# Patient Record
Sex: FEMALE | Race: WHITE
[De-identification: names, ages, dates, MRNs, and addresses within clinical notes are randomized per-mention and may not be internally consistent; named-entity substitution may affect disease eponyms.]

---

## 2017-09-20 ENCOUNTER — HOSPITAL ENCOUNTER (INPATIENT)
Dept: HOSPITAL 83 - ED | Age: 72
LOS: 2 days | Discharge: HOME | DRG: 438 | End: 2017-09-22
Attending: INTERNAL MEDICINE | Admitting: INTERNAL MEDICINE
Payer: COMMERCIAL

## 2017-09-20 VITALS — BODY MASS INDEX: 24.66 KG/M2 | WEIGHT: 148 LBS | HEIGHT: 65 IN

## 2017-09-20 VITALS — DIASTOLIC BLOOD PRESSURE: 71 MMHG

## 2017-09-20 VITALS — DIASTOLIC BLOOD PRESSURE: 71 MMHG | SYSTOLIC BLOOD PRESSURE: 159 MMHG

## 2017-09-20 VITALS — SYSTOLIC BLOOD PRESSURE: 125 MMHG | DIASTOLIC BLOOD PRESSURE: 71 MMHG

## 2017-09-20 VITALS — DIASTOLIC BLOOD PRESSURE: 90 MMHG

## 2017-09-20 DIAGNOSIS — E44.0: ICD-10-CM

## 2017-09-20 DIAGNOSIS — E86.0: ICD-10-CM

## 2017-09-20 DIAGNOSIS — N17.0: ICD-10-CM

## 2017-09-20 DIAGNOSIS — I25.2: ICD-10-CM

## 2017-09-20 DIAGNOSIS — M54.9: ICD-10-CM

## 2017-09-20 DIAGNOSIS — E87.2: ICD-10-CM

## 2017-09-20 DIAGNOSIS — Z79.84: ICD-10-CM

## 2017-09-20 DIAGNOSIS — Z98.51: ICD-10-CM

## 2017-09-20 DIAGNOSIS — I10: ICD-10-CM

## 2017-09-20 DIAGNOSIS — Z88.0: ICD-10-CM

## 2017-09-20 DIAGNOSIS — Z95.5: ICD-10-CM

## 2017-09-20 DIAGNOSIS — G89.29: ICD-10-CM

## 2017-09-20 DIAGNOSIS — E78.5: ICD-10-CM

## 2017-09-20 DIAGNOSIS — D64.9: ICD-10-CM

## 2017-09-20 DIAGNOSIS — K85.90: Primary | ICD-10-CM

## 2017-09-20 DIAGNOSIS — K52.9: ICD-10-CM

## 2017-09-20 DIAGNOSIS — I25.10: ICD-10-CM

## 2017-09-20 DIAGNOSIS — Z79.899: ICD-10-CM

## 2017-09-20 DIAGNOSIS — M51.36: ICD-10-CM

## 2017-09-20 DIAGNOSIS — Z79.1: ICD-10-CM

## 2017-09-20 DIAGNOSIS — Z80.1: ICD-10-CM

## 2017-09-20 DIAGNOSIS — N39.0: ICD-10-CM

## 2017-09-20 DIAGNOSIS — Z83.79: ICD-10-CM

## 2017-09-20 DIAGNOSIS — M19.90: ICD-10-CM

## 2017-09-20 DIAGNOSIS — E11.65: ICD-10-CM

## 2017-09-20 DIAGNOSIS — M47.896: ICD-10-CM

## 2017-09-20 LAB
ALBUMIN SERPL-MCNC: 3.3 GM/DL (ref 3.1–4.5)
ALP SERPL-CCNC: 74 U/L (ref 45–117)
ALT SERPL W P-5'-P-CCNC: 21 U/L (ref 12–78)
APPEARANCE UR: CLEAR
APTT PPP: 24.8 SECONDS (ref 20.8–31.5)
AST SERPL-CCNC: 14 IU/L (ref 3–35)
BACTERIA #/AREA URNS HPF: (no result) /[HPF]
BASOPHILS # BLD AUTO: 0.1 10*3/UL (ref 0–0.1)
BASOPHILS NFR BLD AUTO: 0.7 % (ref 0–1)
BILIRUB UR QL STRIP: NEGATIVE
BUN SERPL-MCNC: 26 MG/DL (ref 7–24)
CASTS URNS QL MICRO: (no result)
CHLORIDE SERPL-SCNC: 103 MMOL/L (ref 98–107)
COLOR UR: YELLOW
CREAT SERPL-MCNC: 1.37 MG/DL (ref 0.55–1.02)
EOSINOPHIL # BLD AUTO: 0.2 10*3/UL (ref 0–0.4)
EOSINOPHIL # BLD AUTO: 2.5 % (ref 1–4)
EPI CELLS #/AREA URNS HPF: (no result) /[HPF]
ERYTHROCYTE [DISTWIDTH] IN BLOOD BY AUTOMATED COUNT: 12.8 % (ref 0–14.5)
GLUCOSE UR QL: NEGATIVE
HCT VFR BLD AUTO: 33.3 % (ref 37–47)
HGB BLD-MCNC: 10.9 G/DL (ref 12–16)
HGB UR QL STRIP: NEGATIVE
INR BLD: 1 (ref 2–3.5)
KETONES UR QL STRIP: NEGATIVE
LEUKOCYTE ESTERASE UR QL STRIP: (no result)
LIPASE SERPL-CCNC: 2376 U/L (ref 73–393)
LYMPHOCYTES # BLD AUTO: 1.4 10*3/UL (ref 1.3–4.4)
LYMPHOCYTES NFR BLD AUTO: 16.5 % (ref 27–41)
MAGNESIUM SERPL-MCNC: 2 MG/DL (ref 1.5–2.1)
MCH RBC QN AUTO: 30.8 PG (ref 27–31)
MCHC RBC AUTO-ENTMCNC: 32.7 G/DL (ref 33–37)
MCV RBC AUTO: 94.1 FL (ref 81–99)
MONOCYTES # BLD AUTO: 0.9 10*3/UL (ref 0.1–1)
MONOCYTES NFR BLD MANUAL: 10.5 % (ref 3–9)
NEUT #: 5.9 10*3/UL (ref 2.3–7.9)
NEUT %: 69.4 % (ref 47–73)
NITRITE UR QL STRIP: NEGATIVE
NRBC BLD QL AUTO: 0 % (ref 0–0)
PH UR STRIP: 6 [PH] (ref 5–9)
PLATELET # BLD AUTO: 275 10*3/UL (ref 130–400)
PMV BLD AUTO: 10.6 FL (ref 9.6–12.3)
POTASSIUM SERPL-SCNC: 4.3 MMOL/L (ref 3.5–5.1)
PROT SERPL-MCNC: 6.6 GM/DL (ref 6.4–8.2)
RBC # BLD AUTO: 3.54 10*6/UL (ref 4.1–5.1)
RBC #/AREA URNS HPF: (no result) RBC/HPF (ref 0–2)
SODIUM SERPL-SCNC: 138 MMOL/L (ref 136–145)
SP GR UR: 1.01 (ref 1–1.03)
TROPONIN I SERPL-MCNC: < 0.015 NG/ML (ref ?–0.04)
UROBILINOGEN UR STRIP-MCNC: 0.2 E.U./DL (ref 0.2–1)
WBC NRBC COR # BLD AUTO: 8.5 10*3/UL (ref 4.8–10.8)

## 2017-09-20 NOTE — NUR
A 72, admitted to 5E, under the
services of NABIL Peng DO with a diagnosis of PANCREATITIS, DIZZINESS,
RENAL INSUFFICIENCY.
Chief complaint is DIZZINESS.
Patient arrived via bed from ER.
Monitor applied. Initial assessment completed.
Vital signs taken and recorded.
NABIL PENG DO notified of admission to the unit.
Orders received.
See assessment for past medical history, medications
and allergies.
Patient and/or family oriented to unit.
visitation policy reviewed.
Clothing/patient valuable form completed.
 
MARGIE GALARZA

## 2017-09-21 VITALS — DIASTOLIC BLOOD PRESSURE: 48 MMHG

## 2017-09-21 VITALS — DIASTOLIC BLOOD PRESSURE: 66 MMHG

## 2017-09-21 VITALS — DIASTOLIC BLOOD PRESSURE: 64 MMHG | SYSTOLIC BLOOD PRESSURE: 152 MMHG

## 2017-09-21 VITALS — DIASTOLIC BLOOD PRESSURE: 89 MMHG

## 2017-09-21 VITALS — SYSTOLIC BLOOD PRESSURE: 160 MMHG | DIASTOLIC BLOOD PRESSURE: 58 MMHG

## 2017-09-21 LAB
25(OH)D3 SERPL-MCNC: 32.6 NG/ML (ref 30–100)
ALBUMIN SERPL-MCNC: 3.1 GM/DL (ref 3.1–4.5)
ALP SERPL-CCNC: 66 U/L (ref 45–117)
ALT SERPL W P-5'-P-CCNC: 17 U/L (ref 12–78)
AST SERPL-CCNC: 11 IU/L (ref 3–35)
BASOPHILS # BLD AUTO: 0.1 10*3/UL (ref 0–0.1)
BASOPHILS NFR BLD AUTO: 0.8 % (ref 0–1)
BUN SERPL-MCNC: 16 MG/DL (ref 7–24)
CHLORIDE SERPL-SCNC: 107 MMOL/L (ref 98–107)
CHOLEST SERPL-MCNC: 105 MG/DL (ref ?–200)
CREAT SERPL-MCNC: 1.02 MG/DL (ref 0.55–1.02)
EOSINOPHIL # BLD AUTO: 0.3 10*3/UL (ref 0–0.4)
EOSINOPHIL # BLD AUTO: 4.3 % (ref 1–4)
ERYTHROCYTE [DISTWIDTH] IN BLOOD BY AUTOMATED COUNT: 12.8 % (ref 0–14.5)
HCT VFR BLD AUTO: 31.9 % (ref 37–47)
HDLC SERPL-MCNC: 51 MG/DL (ref 40–60)
HGB BLD-MCNC: 10.5 G/DL (ref 12–16)
LDLC SERPL DIRECT ASSAY-MCNC: 42 MG/DL (ref 9–159)
LIPASE SERPL-CCNC: 377 U/L (ref 73–393)
LYMPHOCYTES # BLD AUTO: 1.3 10*3/UL (ref 1.3–4.4)
LYMPHOCYTES NFR BLD AUTO: 21.1 % (ref 27–41)
MAGNESIUM SERPL-MCNC: 1.6 MG/DL (ref 1.5–2.1)
MCH RBC QN AUTO: 30.8 PG (ref 27–31)
MCHC RBC AUTO-ENTMCNC: 32.9 G/DL (ref 33–37)
MCV RBC AUTO: 93.5 FL (ref 81–99)
MONOCYTES # BLD AUTO: 0.8 10*3/UL (ref 0.1–1)
MONOCYTES NFR BLD MANUAL: 13.6 % (ref 3–9)
NEUT #: 3.7 10*3/UL (ref 2.3–7.9)
NEUT %: 59.9 % (ref 47–73)
NRBC BLD QL AUTO: 0 % (ref 0–0)
PHOSPHATE SERPL-MCNC: 3 MG/DL (ref 2.5–4.9)
PLATELET # BLD AUTO: 261 10*3/UL (ref 130–400)
PMV BLD AUTO: 10.8 FL (ref 9.6–12.3)
POTASSIUM SERPL-SCNC: 3.7 MMOL/L (ref 3.5–5.1)
PROT SERPL-MCNC: 6.2 GM/DL (ref 6.4–8.2)
RBC # BLD AUTO: 3.41 10*6/UL (ref 4.1–5.1)
SODIUM SERPL-SCNC: 143 MMOL/L (ref 136–145)
TRIGL SERPL-MCNC: 59 MG/DL (ref ?–150)
TSH SERPL DL<=0.005 MIU/L-ACNC: 1.46 UIU/ML (ref 0.36–4.75)
VITAMIN B12: 283 PG/ML (ref 247–911)
VLDLC SERPL CALC-MCNC: 12 MG/DL (ref 6–40)
WBC NRBC COR # BLD AUTO: 6.1 10*3/UL (ref 4.8–10.8)

## 2017-09-21 NOTE — NUR
PATIENT STATES SHE IS NO LONGER IN PAIN AT THIS TIME. RESPIRATIONS EASY,
REGULAR. DENIES ANY SOB. WILL CONTINUE TO MONITOR. CALL LIGHT WITHIN REACH.

## 2017-09-21 NOTE — NUR
PT MEDICATED WITH IV MORPHINE SLOWLY PER PRN ORDER FOR C/O PAIN/DISCOMFORT.
RATES PAIN 9/10. WILL MONITOR EFFECTIVENESS.

## 2017-09-21 NOTE — NUR
in to talk to patient.
Patient states lives at home with family.
There are few steps in the home.
Physician: veronica rubio
Pharmacy: [facility]
Home health services: none
Patient's level of ADLs: INDEPENDENT
Patient has working utilities: all working
DME: none
Follow-up physician's appointment after d/c: will be made by hospitalist nurse
director upon discharge
Does patient want to access PORTAL?: no
Discharge plan discussed with patient, patient lives at home with family,
states she is independent in adls and ambulation, drives, but currently doesn't
have a car, states her daughter takes her to get groceries, patient states she
will be going back home and denies any home needs.
 
KALI HIRSCH

## 2017-09-21 NOTE — NUR
PHYSICAL THERAPY
 
Physical Therapy Evaluation completed this date. See eval document for
complete details. No inpnt PT services needed at this time. Recommend OP PT
to address back pain should symptoms not resolve by d/c.
 
Complexity [place]: [place] at [id number] based on chart review and PT eval.
 
Amanda Schmitz, PT

## 2017-09-21 NOTE — NUR
RESTING IN BED WITH NO DISTRESS NOTED. RESPIRATIONS EASY. LUNGS DIMINISHED,
CLEAR. PULSE % RA. DENIES ABD PAIN. CALL LIGHT WITHIN REACH. NO VOICED
COMPLAINTS

## 2017-09-22 VITALS — DIASTOLIC BLOOD PRESSURE: 62 MMHG

## 2017-09-22 VITALS — SYSTOLIC BLOOD PRESSURE: 180 MMHG | DIASTOLIC BLOOD PRESSURE: 76 MMHG

## 2017-09-22 NOTE — NUR
SLEPT THROUGHOUT NIGHT WITH NO DISTRESS NOTED. RESPIRATIONS EASY. DENIES ABD
PAIN. CALL LIGHT WITHIN REACH. NO VOICED COMPLAINTS THIS SHIFT

## 2017-09-22 NOTE — NUR
Discharge instructions reviewed with patient/family. Patient receptive and
verbalizes understanding. Follow-up care arranged. Written instructions given
to patient/family.
AURELIANO CORREA

## 2018-02-13 ENCOUNTER — HOSPITAL ENCOUNTER (INPATIENT)
Dept: HOSPITAL 83 - ED | Age: 73
LOS: 3 days | Discharge: HOME | DRG: 563 | End: 2018-02-16
Attending: EMERGENCY MEDICINE | Admitting: EMERGENCY MEDICINE
Payer: COMMERCIAL

## 2018-02-13 VITALS — WEIGHT: 150.44 LBS | BODY MASS INDEX: 25.07 KG/M2 | HEIGHT: 65 IN

## 2018-02-13 VITALS — DIASTOLIC BLOOD PRESSURE: 81 MMHG

## 2018-02-13 VITALS — SYSTOLIC BLOOD PRESSURE: 164 MMHG | DIASTOLIC BLOOD PRESSURE: 61 MMHG

## 2018-02-13 VITALS — DIASTOLIC BLOOD PRESSURE: 78 MMHG

## 2018-02-13 DIAGNOSIS — Z80.1: ICD-10-CM

## 2018-02-13 DIAGNOSIS — E78.5: ICD-10-CM

## 2018-02-13 DIAGNOSIS — Z82.3: ICD-10-CM
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
## 2018-02-13 DIAGNOSIS — Z84.89: ICD-10-CM

## 2018-02-13 DIAGNOSIS — Z83.79: ICD-10-CM

## 2018-02-13 DIAGNOSIS — E11.22: ICD-10-CM

## 2018-02-13 DIAGNOSIS — Z79.899: ICD-10-CM
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
## 2018-02-13 DIAGNOSIS — I24.8: ICD-10-CM

## 2018-02-13 DIAGNOSIS — N18.3: ICD-10-CM

## 2018-02-13 DIAGNOSIS — Z79.02: ICD-10-CM

## 2018-02-13 DIAGNOSIS — Z87.440: ICD-10-CM

## 2018-02-13 DIAGNOSIS — F41.9: ICD-10-CM

## 2018-02-13 DIAGNOSIS — I25.2: ICD-10-CM

## 2018-02-13 DIAGNOSIS — D64.9: ICD-10-CM

## 2018-02-13 DIAGNOSIS — S62.346B: Primary | ICD-10-CM

## 2018-02-13 DIAGNOSIS — Z95.5: ICD-10-CM

## 2018-02-13 DIAGNOSIS — R94.30: ICD-10-CM

## 2018-02-13 DIAGNOSIS — I12.9: ICD-10-CM

## 2018-02-13 DIAGNOSIS — E11.65: ICD-10-CM

## 2018-02-13 DIAGNOSIS — Z98.51: ICD-10-CM

## 2018-02-13 DIAGNOSIS — I25.119: ICD-10-CM
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
## 2018-02-13 DIAGNOSIS — Z88.0: ICD-10-CM

## 2018-02-13 DIAGNOSIS — R07.9: ICD-10-CM

## 2018-02-13 DIAGNOSIS — Z83.3: ICD-10-CM

## 2018-02-13 LAB
ALBUMIN SERPL-MCNC: 3.3 GM/DL (ref 3.1–4.5)
ALP SERPL-CCNC: 93 U/L (ref 45–117)
ALT SERPL W P-5'-P-CCNC: 20 U/L (ref 12–78)
APTT PPP: 23.5 SECONDS (ref 20.8–31.5)
AST SERPL-CCNC: 12 IU/L (ref 3–35)
BASOPHILS # BLD AUTO: 0.1 10*3/UL (ref 0–0.1)
BASOPHILS NFR BLD AUTO: 0.7 % (ref 0–1)
BUN SERPL-MCNC: 30 MG/DL (ref 7–24)
CHLORIDE SERPL-SCNC: 103 MMOL/L (ref 98–107)
CREAT SERPL-MCNC: 1.24 MG/DL (ref 0.55–1.02)
EOSINOPHIL # BLD AUTO: 0.2 10*3/UL (ref 0–0.4)
EOSINOPHIL # BLD AUTO: 2.4 % (ref 1–4)
ERYTHROCYTE [DISTWIDTH] IN BLOOD BY AUTOMATED COUNT: 12.8 % (ref 0–14.5)
HCT VFR BLD AUTO: 34.3 % (ref 37–47)
HGB BLD-MCNC: 11.3 G/DL (ref 12–16)
INR BLD: 1 (ref 2–3.5)
LYMPHOCYTES # BLD AUTO: 1.3 10*3/UL (ref 1.3–4.4)
LYMPHOCYTES NFR BLD AUTO: 17 % (ref 27–41)
MCH RBC QN AUTO: 30.8 PG (ref 27–31)
MCHC RBC AUTO-ENTMCNC: 32.9 G/DL (ref 33–37)
MCV RBC AUTO: 93.5 FL (ref 81–99)
MONOCYTES # BLD AUTO: 0.8 10*3/UL (ref 0.1–1)
MONOCYTES NFR BLD MANUAL: 10.9 % (ref 3–9)
NEUT #: 5.1 10*3/UL (ref 2.3–7.9)
NEUT %: 68.7 % (ref 47–73)
NRBC BLD QL AUTO: 0 10*3/UL (ref 0–0)
PLATELET # BLD AUTO: 311 10*3/UL (ref 130–400)
PMV BLD AUTO: 10.3 FL (ref 9.6–12.3)
POTASSIUM SERPL-SCNC: 4.2 MMOL/L (ref 3.5–5.1)
PROT SERPL-MCNC: 7.2 GM/DL (ref 6.4–8.2)
RBC # BLD AUTO: 3.67 10*6/UL (ref 4.1–5.1)
SODIUM SERPL-SCNC: 140 MMOL/L (ref 136–145)
TROPONIN I SERPL-MCNC: < 0.015 NG/ML (ref ?–0.04)
WBC NRBC COR # BLD AUTO: 7.4 10*3/UL (ref 4.8–10.8)

## 2018-02-14 VITALS — SYSTOLIC BLOOD PRESSURE: 155 MMHG | DIASTOLIC BLOOD PRESSURE: 79 MMHG

## 2018-02-14 VITALS — SYSTOLIC BLOOD PRESSURE: 144 MMHG | DIASTOLIC BLOOD PRESSURE: 64 MMHG

## 2018-02-14 VITALS — DIASTOLIC BLOOD PRESSURE: 49 MMHG

## 2018-02-14 VITALS — SYSTOLIC BLOOD PRESSURE: 154 MMHG | DIASTOLIC BLOOD PRESSURE: 56 MMHG

## 2018-02-14 VITALS — DIASTOLIC BLOOD PRESSURE: 50 MMHG | SYSTOLIC BLOOD PRESSURE: 141 MMHG

## 2018-02-14 VITALS — SYSTOLIC BLOOD PRESSURE: 135 MMHG | DIASTOLIC BLOOD PRESSURE: 68 MMHG

## 2018-02-14 LAB
ALBUMIN SERPL-MCNC: 3.2 GM/DL (ref 3.1–4.5)
ALP SERPL-CCNC: 81 U/L (ref 45–117)
ALT SERPL W P-5'-P-CCNC: 17 U/L (ref 12–78)
AST SERPL-CCNC: 11 IU/L (ref 3–35)
BASOPHILS # BLD AUTO: 0.1 10*3/UL (ref 0–0.1)
BASOPHILS NFR BLD AUTO: 0.9 % (ref 0–1)
BUN SERPL-MCNC: 24 MG/DL (ref 7–24)
CHLORIDE SERPL-SCNC: 105 MMOL/L (ref 98–107)
CHOLEST SERPL-MCNC: 130 MG/DL (ref ?–200)
CREAT SERPL-MCNC: 0.96 MG/DL (ref 0.55–1.02)
EOSINOPHIL # BLD AUTO: 0.3 10*3/UL (ref 0–0.4)
EOSINOPHIL # BLD AUTO: 3.4 % (ref 1–4)
ERYTHROCYTE [DISTWIDTH] IN BLOOD BY AUTOMATED COUNT: 12.8 % (ref 0–14.5)
HCT VFR BLD AUTO: 35.1 % (ref 37–47)
HDLC SERPL-MCNC: 64 MG/DL (ref 40–60)
HGB BLD-MCNC: 11.6 G/DL (ref 12–16)
LDLC SERPL DIRECT ASSAY-MCNC: 48 MG/DL (ref 9–159)
LYMPHOCYTES # BLD AUTO: 1.3 10*3/UL (ref 1.3–4.4)
LYMPHOCYTES NFR BLD AUTO: 16.3 % (ref 27–41)
MCH RBC QN AUTO: 30.4 PG (ref 27–31)
MCHC RBC AUTO-ENTMCNC: 33 G/DL (ref 33–37)
MCV RBC AUTO: 92.1 FL (ref 81–99)
MONOCYTES # BLD AUTO: 0.9 10*3/UL (ref 0.1–1)
MONOCYTES NFR BLD MANUAL: 11.4 % (ref 3–9)
NEUT #: 5.2 10*3/UL (ref 2.3–7.9)
NEUT %: 67.6 % (ref 47–73)
NRBC BLD QL AUTO: 0 10*3/UL (ref 0–0)
PLATELET # BLD AUTO: 310 10*3/UL (ref 130–400)
PMV BLD AUTO: 11 FL (ref 9.6–12.3)
POTASSIUM SERPL-SCNC: 3.7 MMOL/L (ref 3.5–5.1)
PROT SERPL-MCNC: 6.6 GM/DL (ref 6.4–8.2)
RBC # BLD AUTO: 3.81 10*6/UL (ref 4.1–5.1)
SODIUM SERPL-SCNC: 142 MMOL/L (ref 136–145)
TRIGL SERPL-MCNC: 89 MG/DL (ref ?–150)
TSH SERPL DL<=0.005 MIU/L-ACNC: 2.33 UIU/ML (ref 0.36–4.75)
VLDLC SERPL CALC-MCNC: 18 MG/DL (ref 6–40)
WBC NRBC COR # BLD AUTO: 7.7 10*3/UL (ref 4.8–10.8)

## 2018-02-15 VITALS — DIASTOLIC BLOOD PRESSURE: 63 MMHG | SYSTOLIC BLOOD PRESSURE: 159 MMHG

## 2018-02-15 VITALS — DIASTOLIC BLOOD PRESSURE: 57 MMHG

## 2018-02-15 VITALS — DIASTOLIC BLOOD PRESSURE: 64 MMHG | SYSTOLIC BLOOD PRESSURE: 162 MMHG

## 2018-02-15 VITALS — SYSTOLIC BLOOD PRESSURE: 169 MMHG | DIASTOLIC BLOOD PRESSURE: 65 MMHG

## 2018-02-15 VITALS — DIASTOLIC BLOOD PRESSURE: 50 MMHG

## 2018-02-15 PROCEDURE — 3E073KZ INTRODUCTION OF OTHER DIAGNOSTIC SUBSTANCE INTO CORONARY ARTERY, PERCUTANEOUS APPROACH: ICD-10-PCS | Performed by: INTERNAL MEDICINE

## 2018-02-15 PROCEDURE — 4A02XM4 MEASUREMENT OF CARDIAC TOTAL ACTIVITY, EXTERNAL APPROACH: ICD-10-PCS | Performed by: INTERNAL MEDICINE

## 2018-02-15 PROCEDURE — 2W3EX1Z IMMOBILIZATION OF RIGHT HAND USING SPLINT: ICD-10-PCS | Performed by: FAMILY MEDICINE

## 2018-02-16 VITALS — DIASTOLIC BLOOD PRESSURE: 69 MMHG

## 2018-02-16 VITALS — DIASTOLIC BLOOD PRESSURE: 72 MMHG

## 2018-05-07 ENCOUNTER — HOSPITAL ENCOUNTER (OUTPATIENT)
Dept: HOSPITAL 83 - RESCLI | Age: 73
Discharge: HOME | End: 2018-05-07
Attending: INTERNAL MEDICINE
Payer: COMMERCIAL

## 2018-05-07 DIAGNOSIS — Z88.0: ICD-10-CM

## 2018-05-07 DIAGNOSIS — E78.5: ICD-10-CM

## 2018-05-07 DIAGNOSIS — M19.90: ICD-10-CM

## 2018-05-07 DIAGNOSIS — E11.22: ICD-10-CM

## 2018-05-07 DIAGNOSIS — F41.9: ICD-10-CM

## 2018-05-07 DIAGNOSIS — I12.9: Primary | ICD-10-CM

## 2018-05-07 DIAGNOSIS — I25.10: ICD-10-CM

## 2018-05-07 DIAGNOSIS — M62.838: ICD-10-CM

## 2018-05-07 DIAGNOSIS — N18.3: ICD-10-CM

## 2018-07-22 ENCOUNTER — HOSPITAL ENCOUNTER (EMERGENCY)
Dept: HOSPITAL 83 - ED | Age: 73
Discharge: HOME | End: 2018-07-22
Payer: COMMERCIAL

## 2018-07-22 VITALS — BODY MASS INDEX: 23.16 KG/M2 | WEIGHT: 139 LBS | HEIGHT: 65 IN

## 2018-07-22 VITALS — DIASTOLIC BLOOD PRESSURE: 76 MMHG | SYSTOLIC BLOOD PRESSURE: 164 MMHG

## 2018-07-22 DIAGNOSIS — M25.511: Primary | ICD-10-CM

## 2018-07-22 DIAGNOSIS — Z88.0: ICD-10-CM

## 2018-07-22 DIAGNOSIS — Z79.899: ICD-10-CM

## 2018-07-22 DIAGNOSIS — M79.641: ICD-10-CM

## 2018-08-08 ENCOUNTER — HOSPITAL ENCOUNTER (OUTPATIENT)
Dept: HOSPITAL 83 - LAB | Age: 73
Discharge: HOME | End: 2018-08-08
Attending: INTERNAL MEDICINE
Payer: COMMERCIAL

## 2018-08-08 ENCOUNTER — HOSPITAL ENCOUNTER (OUTPATIENT)
Dept: HOSPITAL 83 - RESCLI | Age: 73
Discharge: HOME | End: 2018-08-08
Payer: COMMERCIAL

## 2018-08-08 DIAGNOSIS — I95.1: ICD-10-CM

## 2018-08-08 DIAGNOSIS — E11.22: ICD-10-CM

## 2018-08-08 DIAGNOSIS — R42: ICD-10-CM

## 2018-08-08 DIAGNOSIS — R53.1: ICD-10-CM

## 2018-08-08 DIAGNOSIS — F41.9: ICD-10-CM

## 2018-08-08 DIAGNOSIS — Z88.0: ICD-10-CM

## 2018-08-08 DIAGNOSIS — I12.9: Primary | ICD-10-CM

## 2018-08-08 DIAGNOSIS — I25.10: ICD-10-CM

## 2018-08-08 DIAGNOSIS — E55.9: ICD-10-CM

## 2018-08-08 DIAGNOSIS — N18.3: ICD-10-CM

## 2018-08-08 DIAGNOSIS — M19.90: ICD-10-CM

## 2018-08-08 DIAGNOSIS — R53.83: Primary | ICD-10-CM

## 2018-08-08 DIAGNOSIS — E78.5: ICD-10-CM

## 2019-09-11 ENCOUNTER — HOSPITAL ENCOUNTER (EMERGENCY)
Dept: HOSPITAL 83 - ED | Age: 74
Discharge: HOME | End: 2019-09-11
Payer: COMMERCIAL

## 2019-09-11 VITALS — BODY MASS INDEX: 24.16 KG/M2 | WEIGHT: 145 LBS | HEIGHT: 65 IN

## 2019-09-11 VITALS — DIASTOLIC BLOOD PRESSURE: 68 MMHG

## 2019-09-11 DIAGNOSIS — Z86.718: ICD-10-CM

## 2019-09-11 DIAGNOSIS — E11.9: ICD-10-CM

## 2019-09-11 DIAGNOSIS — Z98.51: ICD-10-CM

## 2019-09-11 DIAGNOSIS — Z79.899: ICD-10-CM

## 2019-09-11 DIAGNOSIS — I10: ICD-10-CM

## 2019-09-11 DIAGNOSIS — K04.7: Primary | ICD-10-CM

## 2019-09-11 DIAGNOSIS — Z95.5: ICD-10-CM

## 2019-09-11 DIAGNOSIS — I25.10: ICD-10-CM

## 2019-09-11 DIAGNOSIS — Z88.0: ICD-10-CM

## 2019-09-11 DIAGNOSIS — Z86.73: ICD-10-CM

## 2020-01-23 ENCOUNTER — HOSPITAL ENCOUNTER (OUTPATIENT)
Dept: HOSPITAL 83 - RESCLI | Age: 75
Discharge: HOME | End: 2020-01-23
Attending: INTERNAL MEDICINE
Payer: COMMERCIAL

## 2020-01-23 DIAGNOSIS — F41.9: ICD-10-CM

## 2020-01-23 DIAGNOSIS — Z12.39: ICD-10-CM

## 2020-01-23 DIAGNOSIS — I25.10: ICD-10-CM

## 2020-01-23 DIAGNOSIS — M19.90: ICD-10-CM

## 2020-01-23 DIAGNOSIS — Z12.11: Primary | ICD-10-CM

## 2020-01-23 DIAGNOSIS — N18.3: ICD-10-CM

## 2020-01-23 DIAGNOSIS — Z79.899: ICD-10-CM

## 2020-01-23 DIAGNOSIS — E55.9: ICD-10-CM

## 2020-01-23 DIAGNOSIS — I12.9: ICD-10-CM

## 2020-01-23 DIAGNOSIS — Z88.0: ICD-10-CM

## 2020-01-23 DIAGNOSIS — E11.22: ICD-10-CM

## 2020-01-23 DIAGNOSIS — E78.5: ICD-10-CM

## 2020-01-23 LAB
ALBUMIN SERPL-MCNC: 3.4 GM/DL (ref 3.1–4.5)
ALP SERPL-CCNC: 104 U/L (ref 45–117)
ALT SERPL W P-5'-P-CCNC: 25 U/L (ref 12–78)
AST SERPL-CCNC: 11 IU/L (ref 3–35)
BASOPHILS # BLD AUTO: 0.1 10*3/UL (ref 0–0.1)
BASOPHILS NFR BLD AUTO: 0.8 % (ref 0–1)
BUN SERPL-MCNC: 30 MG/DL (ref 7–24)
CHLORIDE SERPL-SCNC: 105 MMOL/L (ref 98–107)
CHOLEST SERPL-MCNC: 154 MG/DL (ref ?–200)
CREAT SERPL-MCNC: 1.4 MG/DL (ref 0.55–1.02)
EOSINOPHIL # BLD AUTO: 0.2 10*3/UL (ref 0–0.4)
EOSINOPHIL # BLD AUTO: 2.8 % (ref 1–4)
ERYTHROCYTE [DISTWIDTH] IN BLOOD BY AUTOMATED COUNT: 13 % (ref 0–14.5)
HCT VFR BLD AUTO: 39.5 % (ref 37–47)
HDLC SERPL-MCNC: 67 MG/DL (ref 40–60)
HGB BLD-MCNC: 12.7 G/DL (ref 12–16)
LDLC SERPL DIRECT ASSAY-MCNC: 61 MG/DL (ref 9–159)
LYMPHOCYTES # BLD AUTO: 1.4 10*3/UL (ref 1.3–4.4)
LYMPHOCYTES NFR BLD AUTO: 18.6 % (ref 27–41)
MCH RBC QN AUTO: 31.1 PG (ref 27–31)
MCHC RBC AUTO-ENTMCNC: 32.2 G/DL (ref 33–37)
MCV RBC AUTO: 96.6 FL (ref 81–99)
MONOCYTES # BLD AUTO: 0.9 10*3/UL (ref 0.1–1)
MONOCYTES NFR BLD MANUAL: 11.9 % (ref 3–9)
NEUT #: 4.9 10*3/UL (ref 2.3–7.9)
NEUT %: 65.8 % (ref 47–73)
NRBC BLD QL AUTO: 0 % (ref 0–0)
PHOSPHATE SERPL-MCNC: 3.2 MG/DL (ref 2.5–4.9)
PLATELET # BLD AUTO: 269 10*3/UL (ref 130–400)
PMV BLD AUTO: 10.5 FL (ref 9.6–12.3)
POTASSIUM SERPL-SCNC: 4.2 MMOL/L (ref 3.5–5.1)
PROT SERPL-MCNC: 6.8 GM/DL (ref 6.4–8.2)
RBC # BLD AUTO: 4.09 10*6/UL (ref 4.1–5.1)
SODIUM SERPL-SCNC: 137 MMOL/L (ref 136–145)
TRIGL SERPL-MCNC: 128 MG/DL (ref ?–150)
VLDLC SERPL CALC-MCNC: 26 MG/DL (ref 6–40)
WBC NRBC COR # BLD AUTO: 7.4 10*3/UL (ref 4.8–10.8)

## 2020-02-17 ENCOUNTER — HOSPITAL ENCOUNTER (OUTPATIENT)
Dept: HOSPITAL 83 - RESCLI | Age: 75
Discharge: HOME | End: 2020-02-17
Attending: STUDENT IN AN ORGANIZED HEALTH CARE EDUCATION/TRAINING PROGRAM
Payer: COMMERCIAL

## 2020-02-17 DIAGNOSIS — E55.9: ICD-10-CM

## 2020-02-17 DIAGNOSIS — N18.3: ICD-10-CM

## 2020-02-17 DIAGNOSIS — J30.2: ICD-10-CM

## 2020-02-17 DIAGNOSIS — J32.1: ICD-10-CM

## 2020-02-17 DIAGNOSIS — F41.9: ICD-10-CM

## 2020-02-17 DIAGNOSIS — Z79.899: ICD-10-CM

## 2020-02-17 DIAGNOSIS — I13.0: Primary | ICD-10-CM

## 2020-02-17 DIAGNOSIS — I25.10: ICD-10-CM

## 2020-02-17 DIAGNOSIS — E78.5: ICD-10-CM

## 2020-02-17 DIAGNOSIS — E11.22: ICD-10-CM

## 2020-02-17 DIAGNOSIS — Z88.0: ICD-10-CM

## 2020-02-17 LAB
BUN SERPL-MCNC: 25 MG/DL (ref 7–24)
CHLORIDE SERPL-SCNC: 109 MMOL/L (ref 98–107)
CREAT SERPL-MCNC: 1.13 MG/DL (ref 0.55–1.02)
POTASSIUM SERPL-SCNC: 4.1 MMOL/L (ref 3.5–5.1)
SODIUM SERPL-SCNC: 142 MMOL/L (ref 136–145)

## 2020-02-18 LAB — CREAT UR-MCNC: 28.4 MG/DL

## 2020-02-26 ENCOUNTER — HOSPITAL ENCOUNTER (INPATIENT)
Dept: HOSPITAL 83 - ED | Age: 75
LOS: 2 days | Discharge: HOME | DRG: 189 | End: 2020-02-28
Attending: INTERNAL MEDICINE | Admitting: INTERNAL MEDICINE
Payer: COMMERCIAL

## 2020-02-26 VITALS — HEIGHT: 65 IN | WEIGHT: 154 LBS | BODY MASS INDEX: 25.66 KG/M2

## 2020-02-26 VITALS — SYSTOLIC BLOOD PRESSURE: 192 MMHG | DIASTOLIC BLOOD PRESSURE: 87 MMHG

## 2020-02-26 VITALS — DIASTOLIC BLOOD PRESSURE: 82 MMHG

## 2020-02-26 VITALS — SYSTOLIC BLOOD PRESSURE: 199 MMHG | DIASTOLIC BLOOD PRESSURE: 84 MMHG

## 2020-02-26 VITALS — SYSTOLIC BLOOD PRESSURE: 166 MMHG | DIASTOLIC BLOOD PRESSURE: 94 MMHG

## 2020-02-26 VITALS — SYSTOLIC BLOOD PRESSURE: 186 MMHG | DIASTOLIC BLOOD PRESSURE: 90 MMHG

## 2020-02-26 VITALS — DIASTOLIC BLOOD PRESSURE: 100 MMHG

## 2020-02-26 VITALS — DIASTOLIC BLOOD PRESSURE: 78 MMHG

## 2020-02-26 DIAGNOSIS — Z88.0: ICD-10-CM

## 2020-02-26 DIAGNOSIS — Z95.5: ICD-10-CM

## 2020-02-26 DIAGNOSIS — I25.10: ICD-10-CM

## 2020-02-26 DIAGNOSIS — Z83.79: ICD-10-CM

## 2020-02-26 DIAGNOSIS — Z80.1: ICD-10-CM

## 2020-02-26 DIAGNOSIS — M51.37: ICD-10-CM

## 2020-02-26 DIAGNOSIS — E44.1: ICD-10-CM

## 2020-02-26 DIAGNOSIS — E11.65: ICD-10-CM

## 2020-02-26 DIAGNOSIS — I12.9: ICD-10-CM

## 2020-02-26 DIAGNOSIS — I25.2: ICD-10-CM

## 2020-02-26 DIAGNOSIS — E78.5: ICD-10-CM

## 2020-02-26 DIAGNOSIS — D72.810: ICD-10-CM

## 2020-02-26 DIAGNOSIS — J81.0: Primary | ICD-10-CM

## 2020-02-26 DIAGNOSIS — J96.01: ICD-10-CM

## 2020-02-26 DIAGNOSIS — D64.9: ICD-10-CM

## 2020-02-26 DIAGNOSIS — E11.22: ICD-10-CM

## 2020-02-26 DIAGNOSIS — I16.1: ICD-10-CM

## 2020-02-26 DIAGNOSIS — Z79.899: ICD-10-CM

## 2020-02-26 DIAGNOSIS — N18.3: ICD-10-CM

## 2020-02-26 DIAGNOSIS — Z98.51: ICD-10-CM

## 2020-02-26 LAB
ALBUMIN SERPL-MCNC: 3 GM/DL (ref 3.1–4.5)
ALP SERPL-CCNC: 88 U/L (ref 45–117)
ALT SERPL W P-5'-P-CCNC: 59 U/L (ref 12–78)
APTT PPP: 24.9 SECONDS (ref 20–32.1)
AST SERPL-CCNC: 41 IU/L (ref 3–35)
BASOPHILS # BLD AUTO: 0.1 10*3/UL (ref 0–0.1)
BASOPHILS NFR BLD AUTO: 0.6 % (ref 0–1)
BUN SERPL-MCNC: 27 MG/DL (ref 7–24)
CHLORIDE SERPL-SCNC: 105 MMOL/L (ref 98–107)
CREAT SERPL-MCNC: 1.22 MG/DL (ref 0.55–1.02)
EOSINOPHIL # BLD AUTO: 0.4 10*3/UL (ref 0–0.4)
EOSINOPHIL # BLD AUTO: 3.8 % (ref 1–4)
ERYTHROCYTE [DISTWIDTH] IN BLOOD BY AUTOMATED COUNT: 13.3 % (ref 0–14.5)
HCT VFR BLD AUTO: 33.7 % (ref 37–47)
HGB BLD-MCNC: 10.8 G/DL (ref 12–16)
INR BLD: 1 (ref 2–3.5)
LYMPHOCYTES # BLD AUTO: 0.8 10*3/UL (ref 1.3–4.4)
LYMPHOCYTES NFR BLD AUTO: 7.8 % (ref 27–41)
MCH RBC QN AUTO: 30.9 PG (ref 27–31)
MCHC RBC AUTO-ENTMCNC: 32 G/DL (ref 33–37)
MCV RBC AUTO: 96.3 FL (ref 81–99)
MONOCYTES # BLD AUTO: 1.3 10*3/UL (ref 0.1–1)
MONOCYTES NFR BLD MANUAL: 11.9 % (ref 3–9)
NEUT #: 8.1 10*3/UL (ref 2.3–7.9)
NEUT %: 75.4 % (ref 47–73)
NRBC BLD QL AUTO: 0 % (ref 0–0)
PLATELET # BLD AUTO: 275 10*3/UL (ref 130–400)
PMV BLD AUTO: 11 FL (ref 9.6–12.3)
POTASSIUM SERPL-SCNC: 4 MMOL/L (ref 3.5–5.1)
PROT SERPL-MCNC: 6.3 GM/DL (ref 6.4–8.2)
RBC # BLD AUTO: 3.5 10*6/UL (ref 4.1–5.1)
SODIUM SERPL-SCNC: 138 MMOL/L (ref 136–145)
TROPONIN I SERPL-MCNC: 0.03 NG/ML (ref ?–0.04)
WBC NRBC COR # BLD AUTO: 10.8 10*3/UL (ref 4.8–10.8)

## 2020-02-27 VITALS — DIASTOLIC BLOOD PRESSURE: 72 MMHG | SYSTOLIC BLOOD PRESSURE: 178 MMHG

## 2020-02-27 VITALS — DIASTOLIC BLOOD PRESSURE: 52 MMHG | SYSTOLIC BLOOD PRESSURE: 154 MMHG

## 2020-02-27 VITALS — DIASTOLIC BLOOD PRESSURE: 64 MMHG

## 2020-02-27 VITALS — DIASTOLIC BLOOD PRESSURE: 80 MMHG

## 2020-02-27 VITALS — DIASTOLIC BLOOD PRESSURE: 50 MMHG

## 2020-02-27 LAB
25(OH)D3 SERPL-MCNC: 37.5 NG/ML (ref 30–100)
ALBUMIN SERPL-MCNC: 3 GM/DL (ref 3.1–4.5)
ALP SERPL-CCNC: 84 U/L (ref 45–117)
ALT SERPL W P-5'-P-CCNC: 52 U/L (ref 12–78)
APPEARANCE UR: CLEAR
APTT PPP: 26.1 SECONDS (ref 20–32.1)
AST SERPL-CCNC: 30 IU/L (ref 3–35)
BASOPHILS # BLD AUTO: 0.1 10*3/UL (ref 0–0.1)
BASOPHILS NFR BLD AUTO: 0.8 % (ref 0–1)
BILIRUB UR QL STRIP: NEGATIVE
BUN SERPL-MCNC: 27 MG/DL (ref 7–24)
CHLORIDE SERPL-SCNC: 104 MMOL/L (ref 98–107)
CHOLEST SERPL-MCNC: 129 MG/DL (ref ?–200)
COLOR UR: YELLOW
CREAT SERPL-MCNC: 1.16 MG/DL (ref 0.55–1.02)
EOSINOPHIL # BLD AUTO: 0.2 10*3/UL (ref 0–0.4)
EOSINOPHIL # BLD AUTO: 2.2 % (ref 1–4)
ERYTHROCYTE [DISTWIDTH] IN BLOOD BY AUTOMATED COUNT: 13.2 % (ref 0–14.5)
GLUCOSE UR QL: NEGATIVE
HCT VFR BLD AUTO: 33.1 % (ref 37–47)
HDLC SERPL-MCNC: 57 MG/DL (ref 40–60)
HGB BLD-MCNC: 10.6 G/DL (ref 12–16)
HGB UR QL STRIP: (no result)
INR BLD: 1 (ref 2–3.5)
KETONES UR QL STRIP: NEGATIVE
LDLC SERPL DIRECT ASSAY-MCNC: 55 MG/DL (ref 9–159)
LEUKOCYTE ESTERASE UR QL STRIP: (no result)
LYMPHOCYTES # BLD AUTO: 1.2 10*3/UL (ref 1.3–4.4)
LYMPHOCYTES NFR BLD AUTO: 14.9 % (ref 27–41)
MCH RBC QN AUTO: 30.2 PG (ref 27–31)
MCHC RBC AUTO-ENTMCNC: 32 G/DL (ref 33–37)
MCV RBC AUTO: 94.3 FL (ref 81–99)
MONOCYTES # BLD AUTO: 1.1 10*3/UL (ref 0.1–1)
MONOCYTES NFR BLD MANUAL: 13.6 % (ref 3–9)
NEUT #: 5.7 10*3/UL (ref 2.3–7.9)
NEUT %: 68.3 % (ref 47–73)
NITRITE UR QL STRIP: NEGATIVE
NRBC BLD QL AUTO: 0 % (ref 0–0)
PH UR STRIP: 7.5 [PH] (ref 5–9)
PHOSPHATE SERPL-MCNC: 4.1 MG/DL (ref 2.5–4.9)
PLATELET # BLD AUTO: 278 10*3/UL (ref 130–400)
PMV BLD AUTO: 11.4 FL (ref 9.6–12.3)
POTASSIUM SERPL-SCNC: 3.7 MMOL/L (ref 3.5–5.1)
PROT SERPL-MCNC: 5.8 GM/DL (ref 6.4–8.2)
RBC # BLD AUTO: 3.51 10*6/UL (ref 4.1–5.1)
SODIUM SERPL-SCNC: 140 MMOL/L (ref 136–145)
SP GR UR: 1.01 (ref 1–1.03)
T4 FREE SERPL-MCNC: 1.21 NG/DL (ref 0.76–1.46)
TRIGL SERPL-MCNC: 83 MG/DL (ref ?–150)
TSH SERPL DL<=0.005 MIU/L-ACNC: 2.31 UIU/ML (ref 0.36–4.75)
UROBILINOGEN UR STRIP-MCNC: 0.2 E.U./DL (ref 0.2–1)
VITAMIN B12: 224 PG/ML (ref 247–911)
VLDLC SERPL CALC-MCNC: 17 MG/DL (ref 6–40)
WBC #/AREA URNS HPF: (no result) WBC/HPF (ref 0–5)
WBC NRBC COR # BLD AUTO: 8.3 10*3/UL (ref 4.8–10.8)

## 2020-02-28 VITALS — SYSTOLIC BLOOD PRESSURE: 128 MMHG | DIASTOLIC BLOOD PRESSURE: 70 MMHG

## 2020-02-28 VITALS — DIASTOLIC BLOOD PRESSURE: 72 MMHG

## 2020-02-28 VITALS — DIASTOLIC BLOOD PRESSURE: 50 MMHG | SYSTOLIC BLOOD PRESSURE: 168 MMHG

## 2020-02-28 LAB
APPEARANCE UR: CLEAR
BASOPHILS # BLD AUTO: 0.1 10*3/UL (ref 0–0.1)
BASOPHILS NFR BLD AUTO: 1.2 % (ref 0–1)
BILIRUB UR QL STRIP: NEGATIVE
BUN SERPL-MCNC: 34 MG/DL (ref 7–24)
CHLORIDE SERPL-SCNC: 105 MMOL/L (ref 98–107)
COLOR UR: YELLOW
CREAT SERPL-MCNC: 1.29 MG/DL (ref 0.55–1.02)
EOSINOPHIL # BLD AUTO: 0.5 10*3/UL (ref 0–0.4)
EOSINOPHIL # BLD AUTO: 7.5 % (ref 1–4)
ERYTHROCYTE [DISTWIDTH] IN BLOOD BY AUTOMATED COUNT: 13.2 % (ref 0–14.5)
GLUCOSE UR QL: NEGATIVE
HCT VFR BLD AUTO: 32.3 % (ref 37–47)
HGB BLD-MCNC: 10.2 G/DL (ref 12–16)
HGB UR QL STRIP: NEGATIVE
KETONES UR QL STRIP: NEGATIVE
LEUKOCYTE ESTERASE UR QL STRIP: (no result)
LYMPHOCYTES # BLD AUTO: 1.4 10*3/UL (ref 1.3–4.4)
LYMPHOCYTES NFR BLD AUTO: 19.9 % (ref 27–41)
MCH RBC QN AUTO: 30 PG (ref 27–31)
MCHC RBC AUTO-ENTMCNC: 31.6 G/DL (ref 33–37)
MCV RBC AUTO: 95 FL (ref 81–99)
MONOCYTES # BLD AUTO: 1 10*3/UL (ref 0.1–1)
MONOCYTES NFR BLD MANUAL: 14.6 % (ref 3–9)
NEUT #: 3.8 10*3/UL (ref 2.3–7.9)
NEUT %: 56.7 % (ref 47–73)
NITRITE UR QL STRIP: NEGATIVE
NRBC BLD QL AUTO: 0 10*3/UL (ref 0–0)
PH UR STRIP: 7.5 [PH] (ref 5–9)
PLATELET # BLD AUTO: 287 10*3/UL (ref 130–400)
PMV BLD AUTO: 11.2 FL (ref 9.6–12.3)
POTASSIUM SERPL-SCNC: 3.9 MMOL/L (ref 3.5–5.1)
RBC # BLD AUTO: 3.4 10*6/UL (ref 4.1–5.1)
RBC #/AREA URNS HPF: (no result) RBC/HPF (ref 0–2)
SODIUM SERPL-SCNC: 140 MMOL/L (ref 136–145)
SP GR UR: 1 (ref 1–1.03)
UROBILINOGEN UR STRIP-MCNC: 0.2 E.U./DL (ref 0.2–1)
WBC #/AREA URNS HPF: (no result) WBC/HPF (ref 0–5)
WBC NRBC COR # BLD AUTO: 6.8 10*3/UL (ref 4.8–10.8)

## 2020-05-18 ENCOUNTER — HOSPITAL ENCOUNTER (EMERGENCY)
Dept: HOSPITAL 83 - ED | Age: 75
Discharge: HOME | End: 2020-05-18
Payer: COMMERCIAL

## 2020-05-18 VITALS — DIASTOLIC BLOOD PRESSURE: 84 MMHG

## 2020-05-18 VITALS — HEIGHT: 65 IN | WEIGHT: 150 LBS | BODY MASS INDEX: 24.99 KG/M2

## 2020-05-18 DIAGNOSIS — E11.22: ICD-10-CM

## 2020-05-18 DIAGNOSIS — E78.5: ICD-10-CM

## 2020-05-18 DIAGNOSIS — I12.9: ICD-10-CM

## 2020-05-18 DIAGNOSIS — J90: Primary | ICD-10-CM

## 2020-05-18 DIAGNOSIS — I25.10: ICD-10-CM

## 2020-05-18 DIAGNOSIS — R05: ICD-10-CM

## 2020-05-18 DIAGNOSIS — Z88.0: ICD-10-CM

## 2020-05-18 DIAGNOSIS — N18.9: ICD-10-CM

## 2020-05-18 DIAGNOSIS — J18.9: ICD-10-CM

## 2020-05-18 DIAGNOSIS — Z79.899: ICD-10-CM

## 2020-05-18 LAB
APPEARANCE UR: CLEAR
BASOPHILS # BLD AUTO: 0.1 10*3/UL (ref 0–0.1)
BASOPHILS NFR BLD AUTO: 1 % (ref 0–1)
BILIRUB UR QL STRIP: NEGATIVE
BUN SERPL-MCNC: 29 MG/DL (ref 7–24)
CHLORIDE SERPL-SCNC: 108 MMOL/L (ref 98–107)
COLOR UR: YELLOW
CREAT SERPL-MCNC: 1.43 MG/DL (ref 0.55–1.02)
EOSINOPHIL # BLD AUTO: 0.3 10*3/UL (ref 0–0.4)
EOSINOPHIL # BLD AUTO: 3.3 % (ref 1–4)
EPI CELLS #/AREA URNS HPF: (no result) /[HPF]
ERYTHROCYTE [DISTWIDTH] IN BLOOD BY AUTOMATED COUNT: 13.8 % (ref 0–14.5)
GLUCOSE UR QL: NEGATIVE
HCT VFR BLD AUTO: 34.8 % (ref 37–47)
HGB UR QL STRIP: NEGATIVE
KETONES UR QL STRIP: NEGATIVE
LEUKOCYTE ESTERASE UR QL STRIP: (no result)
LYMPHOCYTES # BLD AUTO: 0.7 10*3/UL (ref 1.3–4.4)
LYMPHOCYTES NFR BLD AUTO: 8.8 % (ref 27–41)
MCH RBC QN AUTO: 30.5 PG (ref 27–31)
MCHC RBC AUTO-ENTMCNC: 32.8 G/DL (ref 33–37)
MCV RBC AUTO: 93 FL (ref 81–99)
MONOCYTES # BLD AUTO: 1.1 10*3/UL (ref 0.1–1)
MONOCYTES NFR BLD MANUAL: 13.1 % (ref 3–9)
NEUT #: 6 10*3/UL (ref 2.3–7.9)
NEUT %: 73.3 % (ref 47–73)
NITRITE UR QL STRIP: NEGATIVE
NRBC BLD QL AUTO: 0 % (ref 0–0)
PH UR STRIP: 6.5 [PH] (ref 5–9)
PLATELET # BLD AUTO: 279 10*3/UL (ref 130–400)
PMV BLD AUTO: 10.7 FL (ref 9.6–12.3)
POTASSIUM SERPL-SCNC: 4 MMOL/L (ref 3.5–5.1)
RBC # BLD AUTO: 3.74 10*6/UL (ref 4.1–5.1)
SODIUM SERPL-SCNC: 138 MMOL/L (ref 136–145)
SP GR UR: 1.01 (ref 1–1.03)
TROPONIN I SERPL-MCNC: < 0.015 NG/ML (ref ?–0.04)
UROBILINOGEN UR STRIP-MCNC: < 0.2 E.U./DL (ref 0.2–1)
WBC #/AREA URNS HPF: (no result) WBC/HPF (ref 0–5)
WBC NRBC COR # BLD AUTO: 8.2 10*3/UL (ref 4.8–10.8)

## 2020-05-26 ENCOUNTER — HOSPITAL ENCOUNTER (EMERGENCY)
Dept: HOSPITAL 83 - ED | Age: 75
Discharge: HOME | End: 2020-05-26
Payer: COMMERCIAL

## 2020-05-26 VITALS — BODY MASS INDEX: 24.99 KG/M2 | HEIGHT: 65 IN | WEIGHT: 150 LBS

## 2020-05-26 VITALS — SYSTOLIC BLOOD PRESSURE: 186 MMHG | DIASTOLIC BLOOD PRESSURE: 75 MMHG

## 2020-05-26 DIAGNOSIS — I10: ICD-10-CM

## 2020-05-26 DIAGNOSIS — Z88.0: ICD-10-CM

## 2020-05-26 DIAGNOSIS — Z86.73: ICD-10-CM

## 2020-05-26 DIAGNOSIS — I25.10: ICD-10-CM

## 2020-05-26 DIAGNOSIS — J18.9: Primary | ICD-10-CM

## 2020-05-26 DIAGNOSIS — Z79.899: ICD-10-CM

## 2020-05-26 DIAGNOSIS — E11.9: ICD-10-CM

## 2020-07-18 ENCOUNTER — HOSPITAL ENCOUNTER (OUTPATIENT)
Dept: HOSPITAL 83 - ED | Age: 75
Setting detail: OBSERVATION
LOS: 1 days | Discharge: HOME | End: 2020-07-19
Attending: EMERGENCY MEDICINE | Admitting: EMERGENCY MEDICINE
Payer: COMMERCIAL

## 2020-07-18 VITALS — DIASTOLIC BLOOD PRESSURE: 57 MMHG | SYSTOLIC BLOOD PRESSURE: 148 MMHG

## 2020-07-18 VITALS — SYSTOLIC BLOOD PRESSURE: 206 MMHG | DIASTOLIC BLOOD PRESSURE: 98 MMHG

## 2020-07-18 VITALS — DIASTOLIC BLOOD PRESSURE: 68 MMHG

## 2020-07-18 VITALS — DIASTOLIC BLOOD PRESSURE: 57 MMHG | SYSTOLIC BLOOD PRESSURE: 141 MMHG

## 2020-07-18 VITALS — DIASTOLIC BLOOD PRESSURE: 62 MMHG | SYSTOLIC BLOOD PRESSURE: 167 MMHG

## 2020-07-18 VITALS — SYSTOLIC BLOOD PRESSURE: 196 MMHG | DIASTOLIC BLOOD PRESSURE: 83 MMHG

## 2020-07-18 VITALS — SYSTOLIC BLOOD PRESSURE: 169 MMHG | DIASTOLIC BLOOD PRESSURE: 103 MMHG

## 2020-07-18 VITALS — DIASTOLIC BLOOD PRESSURE: 72 MMHG | SYSTOLIC BLOOD PRESSURE: 176 MMHG

## 2020-07-18 VITALS — DIASTOLIC BLOOD PRESSURE: 85 MMHG | SYSTOLIC BLOOD PRESSURE: 193 MMHG

## 2020-07-18 VITALS — DIASTOLIC BLOOD PRESSURE: 100 MMHG

## 2020-07-18 VITALS — DIASTOLIC BLOOD PRESSURE: 113 MMHG

## 2020-07-18 VITALS — DIASTOLIC BLOOD PRESSURE: 56 MMHG

## 2020-07-18 VITALS — DIASTOLIC BLOOD PRESSURE: 90 MMHG

## 2020-07-18 VITALS — DIASTOLIC BLOOD PRESSURE: 78 MMHG

## 2020-07-18 VITALS — DIASTOLIC BLOOD PRESSURE: 108 MMHG

## 2020-07-18 VITALS — WEIGHT: 147.56 LBS | HEIGHT: 65.98 IN | BODY MASS INDEX: 23.71 KG/M2

## 2020-07-18 VITALS — SYSTOLIC BLOOD PRESSURE: 183 MMHG | DIASTOLIC BLOOD PRESSURE: 70 MMHG

## 2020-07-18 VITALS — DIASTOLIC BLOOD PRESSURE: 65 MMHG

## 2020-07-18 VITALS — DIASTOLIC BLOOD PRESSURE: 94 MMHG

## 2020-07-18 VITALS — DIASTOLIC BLOOD PRESSURE: 67 MMHG

## 2020-07-18 DIAGNOSIS — N18.3: ICD-10-CM

## 2020-07-18 DIAGNOSIS — R09.89: ICD-10-CM

## 2020-07-18 DIAGNOSIS — E11.22: ICD-10-CM

## 2020-07-18 DIAGNOSIS — E44.1: ICD-10-CM

## 2020-07-18 DIAGNOSIS — I12.9: ICD-10-CM

## 2020-07-18 DIAGNOSIS — E78.5: ICD-10-CM

## 2020-07-18 DIAGNOSIS — I16.0: ICD-10-CM

## 2020-07-18 DIAGNOSIS — R55: Primary | ICD-10-CM

## 2020-07-18 DIAGNOSIS — E11.65: ICD-10-CM

## 2020-07-18 DIAGNOSIS — E87.8: ICD-10-CM

## 2020-07-18 DIAGNOSIS — Z79.4: ICD-10-CM

## 2020-07-18 DIAGNOSIS — D64.9: ICD-10-CM

## 2020-07-18 LAB
25(OH)D3 SERPL-MCNC: 35.5 NG/ML (ref 30–100)
ALBUMIN SERPL-MCNC: 3.1 GM/DL (ref 3.1–4.5)
ALP SERPL-CCNC: 86 U/L (ref 45–117)
ALT SERPL W P-5'-P-CCNC: 25 U/L (ref 12–78)
APPEARANCE UR: (no result)
APTT PPP: 25.7 SECONDS (ref 20–32.1)
AST SERPL-CCNC: 14 IU/L (ref 3–35)
BACTERIA #/AREA URNS HPF: (no result) /[HPF]
BASOPHILS # BLD AUTO: 0 10*3/UL (ref 0–0.1)
BASOPHILS NFR BLD AUTO: 0.5 % (ref 0–1)
BILIRUB UR QL STRIP: NEGATIVE
BUN SERPL-MCNC: 22 MG/DL (ref 7–24)
BUN SERPL-MCNC: 26 MG/DL (ref 7–24)
CHLORIDE SERPL-SCNC: 109 MMOL/L (ref 98–107)
CHLORIDE SERPL-SCNC: 109 MMOL/L (ref 98–107)
COLOR UR: YELLOW
CREAT SERPL-MCNC: 1.16 MG/DL (ref 0.55–1.02)
CREAT SERPL-MCNC: 1.2 MG/DL (ref 0.55–1.02)
EOSINOPHIL # BLD AUTO: 0.2 10*3/UL (ref 0–0.4)
EOSINOPHIL # BLD AUTO: 2 % (ref 1–4)
EPI CELLS #/AREA URNS HPF: (no result) /[HPF]
ERYTHROCYTE [DISTWIDTH] IN BLOOD BY AUTOMATED COUNT: 13.6 % (ref 0–14.5)
GLUCOSE UR QL: NEGATIVE
HCT VFR BLD AUTO: 34.9 % (ref 37–47)
HGB UR QL STRIP: (no result)
INR BLD: 1 (ref 2–3.5)
KETONES UR QL STRIP: NEGATIVE
LEUKOCYTE ESTERASE UR QL STRIP: (no result)
LIPASE SERPL-CCNC: 197 U/L (ref 73–393)
LYMPHOCYTES # BLD AUTO: 1 10*3/UL (ref 1.3–4.4)
LYMPHOCYTES NFR BLD AUTO: 13 % (ref 27–41)
MCH RBC QN AUTO: 29.7 PG (ref 27–31)
MCHC RBC AUTO-ENTMCNC: 32.4 G/DL (ref 33–37)
MCV RBC AUTO: 91.8 FL (ref 81–99)
MONOCYTES # BLD AUTO: 1.1 10*3/UL (ref 0.1–1)
MONOCYTES NFR BLD MANUAL: 13.6 % (ref 3–9)
NEUT #: 5.6 10*3/UL (ref 2.3–7.9)
NEUT %: 70.6 % (ref 47–73)
NITRITE UR QL STRIP: NEGATIVE
NRBC BLD QL AUTO: 0 10*3/UL (ref 0–0)
PH UR STRIP: 7 [PH] (ref 5–9)
PLATELET # BLD AUTO: 242 10*3/UL (ref 130–400)
PMV BLD AUTO: 11.3 FL (ref 9.6–12.3)
POTASSIUM SERPL-SCNC: 3.5 MMOL/L (ref 3.5–5.1)
POTASSIUM SERPL-SCNC: 3.7 MMOL/L (ref 3.5–5.1)
PROT SERPL-MCNC: 6.5 GM/DL (ref 6.4–8.2)
RBC # BLD AUTO: 3.8 10*6/UL (ref 4.1–5.1)
SODIUM SERPL-SCNC: 140 MMOL/L (ref 136–145)
SODIUM SERPL-SCNC: 141 MMOL/L (ref 136–145)
SP GR UR: 1.01 (ref 1–1.03)
TROPONIN I SERPL-MCNC: 0.03 NG/ML (ref ?–0.04)
TSH SERPL DL<=0.005 MIU/L-ACNC: 1.43 UIU/ML (ref 0.36–4.75)
UROBILINOGEN UR STRIP-MCNC: 0.2 E.U./DL (ref 0.2–1)
VITAMIN B12: 261 PG/ML (ref 247–911)
WBC #/AREA URNS HPF: (no result) WBC/HPF (ref 0–5)
WBC NRBC COR # BLD AUTO: 7.9 10*3/UL (ref 4.8–10.8)

## 2020-07-19 VITALS — SYSTOLIC BLOOD PRESSURE: 158 MMHG | DIASTOLIC BLOOD PRESSURE: 74 MMHG

## 2020-07-19 VITALS — DIASTOLIC BLOOD PRESSURE: 64 MMHG | SYSTOLIC BLOOD PRESSURE: 165 MMHG

## 2020-07-19 VITALS — DIASTOLIC BLOOD PRESSURE: 66 MMHG

## 2020-07-19 VITALS — DIASTOLIC BLOOD PRESSURE: 72 MMHG

## 2020-07-19 LAB
BASOPHILS # BLD AUTO: 0 10*3/UL (ref 0–0.1)
BASOPHILS NFR BLD AUTO: 0.4 % (ref 0–1)
BUN SERPL-MCNC: 19 MG/DL (ref 7–24)
CHLORIDE SERPL-SCNC: 110 MMOL/L (ref 98–107)
CREAT SERPL-MCNC: 1.18 MG/DL (ref 0.55–1.02)
EOSINOPHIL # BLD AUTO: 0.2 10*3/UL (ref 0–0.4)
EOSINOPHIL # BLD AUTO: 2.4 % (ref 1–4)
ERYTHROCYTE [DISTWIDTH] IN BLOOD BY AUTOMATED COUNT: 13.7 % (ref 0–14.5)
HCT VFR BLD AUTO: 36.5 % (ref 37–47)
LYMPHOCYTES # BLD AUTO: 1 10*3/UL (ref 1.3–4.4)
LYMPHOCYTES NFR BLD AUTO: 14.2 % (ref 27–41)
MCH RBC QN AUTO: 29.7 PG (ref 27–31)
MCHC RBC AUTO-ENTMCNC: 32.1 G/DL (ref 33–37)
MCV RBC AUTO: 92.6 FL (ref 81–99)
MONOCYTES # BLD AUTO: 1 10*3/UL (ref 0.1–1)
MONOCYTES NFR BLD MANUAL: 13.9 % (ref 3–9)
NEUT #: 4.9 10*3/UL (ref 2.3–7.9)
NEUT %: 68.8 % (ref 47–73)
NRBC BLD QL AUTO: 0 10*3/UL (ref 0–0)
PLATELET # BLD AUTO: 230 10*3/UL (ref 130–400)
PMV BLD AUTO: 10.8 FL (ref 9.6–12.3)
POTASSIUM SERPL-SCNC: 3.7 MMOL/L (ref 3.5–5.1)
RBC # BLD AUTO: 3.94 10*6/UL (ref 4.1–5.1)
SODIUM SERPL-SCNC: 142 MMOL/L (ref 136–145)
WBC NRBC COR # BLD AUTO: 7.1 10*3/UL (ref 4.8–10.8)

## 2020-07-28 ENCOUNTER — HOSPITAL ENCOUNTER (OUTPATIENT)
Dept: HOSPITAL 83 - RESCLI | Age: 75
Discharge: HOME | End: 2020-07-28
Attending: INTERNAL MEDICINE
Payer: COMMERCIAL

## 2020-07-28 DIAGNOSIS — Z79.899: ICD-10-CM

## 2020-07-28 DIAGNOSIS — I13.0: Primary | ICD-10-CM

## 2020-07-28 DIAGNOSIS — N18.3: ICD-10-CM

## 2020-07-28 DIAGNOSIS — J30.2: ICD-10-CM

## 2020-07-28 DIAGNOSIS — I25.10: ICD-10-CM

## 2020-07-28 DIAGNOSIS — E11.22: ICD-10-CM

## 2020-07-28 DIAGNOSIS — J20.9: ICD-10-CM

## 2020-07-28 DIAGNOSIS — F41.9: ICD-10-CM

## 2020-07-28 DIAGNOSIS — Z88.0: ICD-10-CM

## 2020-07-28 DIAGNOSIS — E78.5: ICD-10-CM

## 2020-07-28 DIAGNOSIS — I50.22: ICD-10-CM

## 2020-07-28 DIAGNOSIS — J41.1: ICD-10-CM

## 2020-07-28 DIAGNOSIS — Z98.890: ICD-10-CM

## 2020-07-28 DIAGNOSIS — Z95.828: ICD-10-CM

## 2020-09-23 ENCOUNTER — HOSPITAL ENCOUNTER (EMERGENCY)
Dept: HOSPITAL 83 - ED | Age: 75
Discharge: HOME | End: 2020-09-23
Payer: COMMERCIAL

## 2020-09-23 VITALS — HEIGHT: 65 IN | BODY MASS INDEX: 24.16 KG/M2 | WEIGHT: 145 LBS

## 2020-09-23 VITALS — DIASTOLIC BLOOD PRESSURE: 60 MMHG

## 2020-09-23 DIAGNOSIS — Z88.0: ICD-10-CM

## 2020-09-23 DIAGNOSIS — J40: Primary | ICD-10-CM

## 2020-09-23 DIAGNOSIS — Z79.899: ICD-10-CM

## 2020-09-29 ENCOUNTER — HOSPITAL ENCOUNTER (OUTPATIENT)
Dept: HOSPITAL 83 - RESCLI | Age: 75
Discharge: HOME | End: 2020-09-29
Attending: INTERNAL MEDICINE
Payer: COMMERCIAL

## 2020-09-29 DIAGNOSIS — Z95.828: ICD-10-CM

## 2020-09-29 DIAGNOSIS — I50.22: ICD-10-CM

## 2020-09-29 DIAGNOSIS — E55.9: ICD-10-CM

## 2020-09-29 DIAGNOSIS — F41.9: ICD-10-CM

## 2020-09-29 DIAGNOSIS — E78.5: ICD-10-CM

## 2020-09-29 DIAGNOSIS — I25.10: Primary | ICD-10-CM

## 2020-09-29 DIAGNOSIS — Z98.890: ICD-10-CM

## 2020-09-29 DIAGNOSIS — J41.1: ICD-10-CM

## 2020-09-29 DIAGNOSIS — Z79.899: ICD-10-CM

## 2020-09-29 DIAGNOSIS — E11.22: ICD-10-CM

## 2020-09-29 DIAGNOSIS — J30.2: ICD-10-CM

## 2020-09-29 DIAGNOSIS — M19.90: ICD-10-CM

## 2020-09-29 DIAGNOSIS — I13.0: ICD-10-CM

## 2020-09-29 DIAGNOSIS — Z88.0: ICD-10-CM

## 2020-09-29 DIAGNOSIS — N18.3: ICD-10-CM

## 2021-01-04 ENCOUNTER — HOSPITAL ENCOUNTER (OUTPATIENT)
Dept: HOSPITAL 83 - ED | Age: 76
Setting detail: OBSERVATION
LOS: 2 days | Discharge: HOME | End: 2021-01-06
Attending: STUDENT IN AN ORGANIZED HEALTH CARE EDUCATION/TRAINING PROGRAM | Admitting: STUDENT IN AN ORGANIZED HEALTH CARE EDUCATION/TRAINING PROGRAM
Payer: MEDICARE

## 2021-01-04 VITALS — SYSTOLIC BLOOD PRESSURE: 184 MMHG | DIASTOLIC BLOOD PRESSURE: 96 MMHG

## 2021-01-04 VITALS — HEIGHT: 65 IN | WEIGHT: 169.9 LBS | BODY MASS INDEX: 28.31 KG/M2

## 2021-01-04 DIAGNOSIS — R06.02: ICD-10-CM

## 2021-01-04 DIAGNOSIS — J90: ICD-10-CM

## 2021-01-04 DIAGNOSIS — I16.1: Primary | ICD-10-CM

## 2021-01-04 DIAGNOSIS — Z95.5: ICD-10-CM

## 2021-01-04 DIAGNOSIS — N17.0: ICD-10-CM

## 2021-01-04 DIAGNOSIS — R09.89: ICD-10-CM

## 2021-01-04 DIAGNOSIS — E11.65: ICD-10-CM

## 2021-01-04 DIAGNOSIS — E78.5: ICD-10-CM

## 2021-01-04 DIAGNOSIS — N18.30: ICD-10-CM

## 2021-01-04 DIAGNOSIS — I50.23: ICD-10-CM

## 2021-01-04 DIAGNOSIS — Z20.828: ICD-10-CM

## 2021-01-04 DIAGNOSIS — I25.10: ICD-10-CM

## 2021-01-04 DIAGNOSIS — I13.0: ICD-10-CM

## 2021-01-05 VITALS — DIASTOLIC BLOOD PRESSURE: 95 MMHG | SYSTOLIC BLOOD PRESSURE: 115 MMHG

## 2021-01-05 VITALS — DIASTOLIC BLOOD PRESSURE: 104 MMHG

## 2021-01-05 VITALS — DIASTOLIC BLOOD PRESSURE: 82 MMHG

## 2021-01-05 VITALS — DIASTOLIC BLOOD PRESSURE: 81 MMHG

## 2021-01-05 LAB
25(OH)D3 SERPL-MCNC: 47 NG/ML (ref 30–100)
ALBUMIN SERPL-MCNC: 2.8 GM/DL (ref 3.1–4.5)
ALP SERPL-CCNC: 198 U/L (ref 45–117)
ALT SERPL W P-5'-P-CCNC: 42 U/L (ref 12–78)
APTT PPP: 26.9 SECONDS (ref 20–32.1)
AST SERPL-CCNC: 35 IU/L (ref 3–35)
BASOPHILS # BLD AUTO: 0.1 10*3/UL (ref 0–0.1)
BASOPHILS # BLD AUTO: 0.1 10*3/UL (ref 0–0.1)
BASOPHILS NFR BLD AUTO: 1.2 % (ref 0–1)
BASOPHILS NFR BLD AUTO: 1.3 % (ref 0–1)
BUN SERPL-MCNC: 31 MG/DL (ref 7–24)
BUN SERPL-MCNC: 34 MG/DL (ref 7–24)
CHLORIDE SERPL-SCNC: 100 MMOL/L (ref 98–107)
CHLORIDE SERPL-SCNC: 102 MMOL/L (ref 98–107)
CREAT SERPL-MCNC: 1.5 MG/DL (ref 0.55–1.02)
CREAT SERPL-MCNC: 1.58 MG/DL (ref 0.55–1.02)
EOSINOPHIL # BLD AUTO: 0.2 10*3/UL (ref 0–0.4)
EOSINOPHIL # BLD AUTO: 0.2 10*3/UL (ref 0–0.4)
EOSINOPHIL # BLD AUTO: 3.4 % (ref 1–4)
EOSINOPHIL # BLD AUTO: 4 % (ref 1–4)
ERYTHROCYTE [DISTWIDTH] IN BLOOD BY AUTOMATED COUNT: 14.4 % (ref 0–14.5)
ERYTHROCYTE [DISTWIDTH] IN BLOOD BY AUTOMATED COUNT: 14.5 % (ref 0–14.5)
HCT VFR BLD AUTO: 39.9 % (ref 37–47)
HCT VFR BLD AUTO: 41.2 % (ref 37–47)
INR BLD: 1.1 (ref 2–3.5)
LDH SERPL-CCNC: 233 U/L (ref 84–246)
LYMPHOCYTES # BLD AUTO: 0.5 10*3/UL (ref 1.3–4.4)
LYMPHOCYTES # BLD AUTO: 0.5 10*3/UL (ref 1.3–4.4)
LYMPHOCYTES NFR BLD AUTO: 9 % (ref 27–41)
LYMPHOCYTES NFR BLD AUTO: 9.1 % (ref 27–41)
MCH RBC QN AUTO: 27.2 PG (ref 27–31)
MCH RBC QN AUTO: 28 PG (ref 27–31)
MCHC RBC AUTO-ENTMCNC: 30.6 G/DL (ref 33–37)
MCHC RBC AUTO-ENTMCNC: 31.3 G/DL (ref 33–37)
MCV RBC AUTO: 88.8 FL (ref 81–99)
MCV RBC AUTO: 89.5 FL (ref 81–99)
MONOCYTES # BLD AUTO: 0.8 10*3/UL (ref 0.1–1)
MONOCYTES # BLD AUTO: 0.8 10*3/UL (ref 0.1–1)
MONOCYTES NFR BLD MANUAL: 15 % (ref 3–9)
MONOCYTES NFR BLD MANUAL: 16.1 % (ref 3–9)
NEUT #: 3.5 10*3/UL (ref 2.3–7.9)
NEUT #: 3.7 10*3/UL (ref 2.3–7.9)
NEUT %: 69.8 % (ref 47–73)
NEUT %: 70.5 % (ref 47–73)
NRBC BLD QL AUTO: 0 % (ref 0–0)
NRBC BLD QL AUTO: 0 10*3/UL (ref 0–0)
PLATELET # BLD AUTO: 266 10*3/UL (ref 130–400)
PLATELET # BLD AUTO: 268 10*3/UL (ref 130–400)
PMV BLD AUTO: 10.8 FL (ref 9.6–12.3)
PMV BLD AUTO: 11 FL (ref 9.6–12.3)
POTASSIUM SERPL-SCNC: 3.7 MMOL/L (ref 3.5–5.1)
POTASSIUM SERPL-SCNC: 4 MMOL/L (ref 3.5–5.1)
PROT SERPL-MCNC: 6.6 GM/DL (ref 6.4–8.2)
RBC # BLD AUTO: 4.46 10*6/UL (ref 4.1–5.1)
RBC # BLD AUTO: 4.64 10*6/UL (ref 4.1–5.1)
SODIUM SERPL-SCNC: 134 MMOL/L (ref 136–145)
SODIUM SERPL-SCNC: 137 MMOL/L (ref 136–145)
T4 FREE SERPL-MCNC: 1.35 NG/DL (ref 0.76–1.46)
TROPONIN I SERPL-MCNC: 0.04 NG/ML (ref ?–0.04)
TSH SERPL DL<=0.005 MIU/L-ACNC: 6.39 UIU/ML (ref 0.36–4.75)
VITAMIN B12: 542 PG/ML (ref 247–911)
WBC NRBC COR # BLD AUTO: 5 10*3/UL (ref 4.8–10.8)
WBC NRBC COR # BLD AUTO: 5.2 10*3/UL (ref 4.8–10.8)

## 2021-01-05 NOTE — NUR
CCA 75, admitted to , under the
services of HERVE Rowan DO with a diagnosis of ACUTE KIDNEY INJURY, COUGH,
HYPERGYLCEMIA.
Chief complaint is BLE EDEMA, COUGH.
Patient arrived via ambulatory from ER.
Monitor applied. Initial assessment completed.
Vital signs taken and recorded.
HERVE ROWAN DO notified of admission to the unit.
Orders received.
See assessment for past medical history, medications
and allergies.
Patient and/or family oriented to unit. [address]
visitation policy reviewed.
Clothing/patient valuable form completed.
 
HARRY HERNANDEZ.

## 2021-01-05 NOTE — NUR
PT TO REST ROOM AT THIS TIME.  CONSULTING RESIDENTS. PT STATES SHE WOULD LIKE
TO GO HOME AT THIS TIME. WILL CONTINUE TO MONITOR.

## 2021-01-05 NOTE — NUR
REPORT WAS GIVEN TO ME BY DON THAT THIS PT REFUSED THE COVID SWAB THAT WAS
ORDERED EARLIER FOR HER.---CAS SILVA RN

## 2021-01-05 NOTE — NUR
PT REFUSING COVID 19 TESTING AT THIS TIME. MD AWARE. PT BEING PLACED IN COVID
PRECAUTIONS D/T XRAY RESULTS. CALL LIGHT WITH IN REACH. WILL CONTINUE TO
MONITOR.

## 2021-01-06 VITALS — DIASTOLIC BLOOD PRESSURE: 68 MMHG | SYSTOLIC BLOOD PRESSURE: 178 MMHG

## 2021-01-06 VITALS — SYSTOLIC BLOOD PRESSURE: 182 MMHG | DIASTOLIC BLOOD PRESSURE: 96 MMHG

## 2021-01-06 VITALS — DIASTOLIC BLOOD PRESSURE: 72 MMHG | SYSTOLIC BLOOD PRESSURE: 154 MMHG

## 2021-01-06 VITALS — DIASTOLIC BLOOD PRESSURE: 62 MMHG

## 2021-01-06 VITALS — DIASTOLIC BLOOD PRESSURE: 74 MMHG

## 2021-01-06 LAB
BUN SERPL-MCNC: 35 MG/DL (ref 7–24)
CHLORIDE SERPL-SCNC: 104 MMOL/L (ref 98–107)
CREAT SERPL-MCNC: 1.34 MG/DL (ref 0.55–1.02)
POTASSIUM SERPL-SCNC: 3.9 MMOL/L (ref 3.5–5.1)
SODIUM SERPL-SCNC: 137 MMOL/L (ref 136–145)

## 2021-01-06 NOTE — NUR
Discharge instructions reviewed with patient/family. Patient receptive and
verbalizes understanding. Follow-up care arranged. Written instructions given
to patient/family.
HARRY HERNANDEZ.

## 2021-01-06 NOTE — NUR
in to talk to patient.
Patient states lives at home with daughter.
There are no steps in the home.
Physician: resident [facility]
Pharmacy: mail
Home health services: none
Patient's level of ADLs: INDEPENDENT
Patient has working utilities: all working
DME: none
Follow-up physician's appointment after d/c: will be made by hospitalist nurse
director upon discharge
Does patient want to access PORTAL?: no
Discharge plan discussed with patient by phone, she states she lives at home
with her daughter. she states she is independent in adls and ambulation, she
also states she will return home when discharged. discussed with her [facility] and
educated her on the services they provide. she declined any home needs at this
time. case management will follow.
 
KALI HIRSCH

## 2021-01-12 ENCOUNTER — HOSPITAL ENCOUNTER (OUTPATIENT)
Dept: HOSPITAL 83 - RESCLI | Age: 76
Discharge: HOME | End: 2021-01-12
Attending: EMERGENCY MEDICINE
Payer: MEDICARE

## 2021-01-12 DIAGNOSIS — J32.1: ICD-10-CM

## 2021-01-12 DIAGNOSIS — I25.10: Primary | ICD-10-CM

## 2021-01-12 DIAGNOSIS — F41.9: ICD-10-CM

## 2021-01-12 DIAGNOSIS — I13.0: ICD-10-CM

## 2021-01-12 DIAGNOSIS — E55.9: ICD-10-CM

## 2021-01-12 DIAGNOSIS — E78.5: ICD-10-CM

## 2021-01-12 DIAGNOSIS — E11.22: ICD-10-CM

## 2021-01-12 DIAGNOSIS — Z09: ICD-10-CM

## 2021-01-12 DIAGNOSIS — Z79.82: ICD-10-CM

## 2021-01-12 DIAGNOSIS — I50.22: ICD-10-CM

## 2021-01-12 DIAGNOSIS — M19.90: ICD-10-CM

## 2021-01-12 DIAGNOSIS — N18.30: ICD-10-CM

## 2021-01-12 DIAGNOSIS — Z79.899: ICD-10-CM

## 2021-01-12 DIAGNOSIS — Z88.0: ICD-10-CM

## 2021-01-12 DIAGNOSIS — J30.2: ICD-10-CM

## 2021-01-22 ENCOUNTER — HOSPITAL ENCOUNTER (OUTPATIENT)
Dept: HOSPITAL 83 - RESCLI | Age: 76
Discharge: HOME | End: 2021-01-22
Attending: STUDENT IN AN ORGANIZED HEALTH CARE EDUCATION/TRAINING PROGRAM
Payer: MEDICARE

## 2021-01-22 ENCOUNTER — HOSPITAL ENCOUNTER (OUTPATIENT)
Dept: HOSPITAL 83 - LAB | Age: 76
Discharge: HOME | End: 2021-01-22
Attending: INTERNAL MEDICINE
Payer: MEDICARE

## 2021-01-22 DIAGNOSIS — E11.9: ICD-10-CM

## 2021-01-22 DIAGNOSIS — Z88.0: ICD-10-CM

## 2021-01-22 DIAGNOSIS — Z98.61: ICD-10-CM

## 2021-01-22 DIAGNOSIS — I50.23: Primary | ICD-10-CM

## 2021-01-22 DIAGNOSIS — Z79.84: ICD-10-CM

## 2021-01-22 DIAGNOSIS — I13.0: ICD-10-CM

## 2021-01-22 DIAGNOSIS — R60.0: Primary | ICD-10-CM

## 2021-01-22 DIAGNOSIS — Z79.899: ICD-10-CM

## 2021-01-22 DIAGNOSIS — I50.23: ICD-10-CM

## 2021-01-22 LAB
BUN SERPL-MCNC: 35 MG/DL (ref 7–24)
CHLORIDE SERPL-SCNC: 103 MMOL/L (ref 98–107)
CREAT SERPL-MCNC: 1.58 MG/DL (ref 0.55–1.02)
POTASSIUM SERPL-SCNC: 4 MMOL/L (ref 3.5–5.1)
SODIUM SERPL-SCNC: 136 MMOL/L (ref 136–145)